# Patient Record
Sex: FEMALE | Race: OTHER | HISPANIC OR LATINO | URBAN - METROPOLITAN AREA
[De-identification: names, ages, dates, MRNs, and addresses within clinical notes are randomized per-mention and may not be internally consistent; named-entity substitution may affect disease eponyms.]

---

## 2022-02-18 ENCOUNTER — OUTPATIENT (OUTPATIENT)
Dept: OUTPATIENT SERVICES | Facility: HOSPITAL | Age: 47
LOS: 1 days | End: 2022-02-18
Payer: MEDICAID

## 2022-02-18 VITALS
RESPIRATION RATE: 17 BRPM | TEMPERATURE: 99 F | WEIGHT: 164.91 LBS | DIASTOLIC BLOOD PRESSURE: 73 MMHG | HEIGHT: 58 IN | HEART RATE: 60 BPM | OXYGEN SATURATION: 98 % | SYSTOLIC BLOOD PRESSURE: 150 MMHG

## 2022-02-18 DIAGNOSIS — S83.241A OTHER TEAR OF MEDIAL MENISCUS, CURRENT INJURY, RIGHT KNEE, INITIAL ENCOUNTER: ICD-10-CM

## 2022-02-18 DIAGNOSIS — Z98.891 HISTORY OF UTERINE SCAR FROM PREVIOUS SURGERY: Chronic | ICD-10-CM

## 2022-02-18 DIAGNOSIS — Z29.9 ENCOUNTER FOR PROPHYLACTIC MEASURES, UNSPECIFIED: ICD-10-CM

## 2022-02-18 DIAGNOSIS — Z78.9 OTHER SPECIFIED HEALTH STATUS: Chronic | ICD-10-CM

## 2022-02-18 DIAGNOSIS — S83.281A OTHER TEAR OF LATERAL MENISCUS, CURRENT INJURY, RIGHT KNEE, INITIAL ENCOUNTER: ICD-10-CM

## 2022-02-18 DIAGNOSIS — E66.9 OBESITY, UNSPECIFIED: Chronic | ICD-10-CM

## 2022-02-18 DIAGNOSIS — G47.33 OBSTRUCTIVE SLEEP APNEA (ADULT) (PEDIATRIC): ICD-10-CM

## 2022-02-18 DIAGNOSIS — Z01.818 ENCOUNTER FOR OTHER PREPROCEDURAL EXAMINATION: ICD-10-CM

## 2022-02-18 DIAGNOSIS — S83.511A SPRAIN OF ANTERIOR CRUCIATE LIGAMENT OF RIGHT KNEE, INITIAL ENCOUNTER: ICD-10-CM

## 2022-02-18 DIAGNOSIS — E66.9 OBESITY, UNSPECIFIED: ICD-10-CM

## 2022-02-18 LAB
ANION GAP SERPL CALC-SCNC: 4 MMOL/L — LOW (ref 5–17)
APTT BLD: 32.4 SEC — SIGNIFICANT CHANGE UP (ref 27.5–35.5)
BUN SERPL-MCNC: 12 MG/DL — SIGNIFICANT CHANGE UP (ref 7–18)
CALCIUM SERPL-MCNC: 8.9 MG/DL — SIGNIFICANT CHANGE UP (ref 8.4–10.5)
CHLORIDE SERPL-SCNC: 101 MMOL/L — SIGNIFICANT CHANGE UP (ref 96–108)
CO2 SERPL-SCNC: 30 MMOL/L — SIGNIFICANT CHANGE UP (ref 22–31)
CREAT SERPL-MCNC: 0.48 MG/DL — LOW (ref 0.5–1.3)
GLUCOSE SERPL-MCNC: 130 MG/DL — HIGH (ref 70–99)
HCT VFR BLD CALC: 38.1 % — SIGNIFICANT CHANGE UP (ref 34.5–45)
HGB BLD-MCNC: 12.9 G/DL — SIGNIFICANT CHANGE UP (ref 11.5–15.5)
INR BLD: 0.93 RATIO — SIGNIFICANT CHANGE UP (ref 0.88–1.16)
MCHC RBC-ENTMCNC: 28.9 PG — SIGNIFICANT CHANGE UP (ref 27–34)
MCHC RBC-ENTMCNC: 33.9 GM/DL — SIGNIFICANT CHANGE UP (ref 32–36)
MCV RBC AUTO: 85.4 FL — SIGNIFICANT CHANGE UP (ref 80–100)
NRBC # BLD: 0 /100 WBCS — SIGNIFICANT CHANGE UP (ref 0–0)
PLATELET # BLD AUTO: 391 K/UL — SIGNIFICANT CHANGE UP (ref 150–400)
POTASSIUM SERPL-MCNC: 3.4 MMOL/L — LOW (ref 3.5–5.3)
POTASSIUM SERPL-SCNC: 3.4 MMOL/L — LOW (ref 3.5–5.3)
PROTHROM AB SERPL-ACNC: 11.1 SEC — SIGNIFICANT CHANGE UP (ref 10.6–13.6)
RBC # BLD: 4.46 M/UL — SIGNIFICANT CHANGE UP (ref 3.8–5.2)
RBC # FLD: 12.6 % — SIGNIFICANT CHANGE UP (ref 10.3–14.5)
SODIUM SERPL-SCNC: 135 MMOL/L — SIGNIFICANT CHANGE UP (ref 135–145)
WBC # BLD: 10.78 K/UL — HIGH (ref 3.8–10.5)
WBC # FLD AUTO: 10.78 K/UL — HIGH (ref 3.8–10.5)

## 2022-02-18 PROCEDURE — 36415 COLL VENOUS BLD VENIPUNCTURE: CPT

## 2022-02-18 PROCEDURE — 85610 PROTHROMBIN TIME: CPT

## 2022-02-18 PROCEDURE — G0463: CPT

## 2022-02-18 PROCEDURE — 93005 ELECTROCARDIOGRAM TRACING: CPT

## 2022-02-18 PROCEDURE — 80048 BASIC METABOLIC PNL TOTAL CA: CPT

## 2022-02-18 PROCEDURE — 85027 COMPLETE CBC AUTOMATED: CPT

## 2022-02-18 PROCEDURE — 85730 THROMBOPLASTIN TIME PARTIAL: CPT

## 2022-02-18 PROCEDURE — 93010 ELECTROCARDIOGRAM REPORT: CPT

## 2022-02-18 NOTE — H&P PST ADULT - NSICDXPASTMEDICALHX_GEN_ALL_CORE_FT
PAST MEDICAL HISTORY:  Other tear of lateral meniscus of left knee as current injury, initial encounter     Other tear of medial meniscus, current injury, right knee, initial encounter     Sprain of anterior cruciate ligament of right knee, initial encounter

## 2022-02-18 NOTE — H&P PST ADULT - ASSESSMENT
46 yr old female obese no medical history stated she injured her right knee at work about a year ago. Pt went to er due to swelling X-ray done and no treatement rendered film was negative. Pt felt something still not right. Pt went on vacation and fell on her right knee and stated felt like something loosen in her knee. Pt saw orthopedist  had PT and an injection with little result. Pt schedule for right knee arthroscopy with anterior cruciate reconstruction allograft quad possible medial lateral meniscus repair on 2/24/2022. Pt does not use much medicine for discomfort pain 4/10 pt rests when she feels discomfort.

## 2022-02-18 NOTE — H&P PST ADULT - PROBLEM SELECTOR PLAN 3
Pt educated to reduce caloric intake with diet and exercise to reduce weight. Pt to follow up with PCP post procedure

## 2022-02-18 NOTE — H&P PST ADULT - PROBLEM SELECTOR PLAN 1
Pt given both written and verbal instructions to wash with 4% chlorhexidene am of surgery 2/24/2022. Pt aware of NPO the night of 2/24/2022.

## 2022-02-18 NOTE — H&P PST ADULT - NSICDXFAMILYHX_GEN_ALL_CORE_FT
FAMILY HISTORY:  Mother  Still living? Yes, Estimated age: 73  Family history of diabetes mellitus (DM), Age at diagnosis: Age Unknown

## 2022-02-18 NOTE — H&P PST ADULT - HISTORY OF PRESENT ILLNESS
46 yr old female obese no medical history stated she injured her right knee at work about a year ago. Pt went to er due to swelling X-ray done and no treatement rendered film was negative. Pt felt something still not right. Pt went on vacation and fell on her right knee and stated felt like something loosen in her knee. Pt saw orthopedist  had PT and an injection with little result. Pt schedule for right knee arthroscopy with anterior cruciate reconstruction allograft quad possible medial lateral meniscus repair on 2/24/2022. pt does not use much medication rests when her knee bothers her. Pain 4/10.at times.

## 2022-02-24 ENCOUNTER — OUTPATIENT (OUTPATIENT)
Dept: OUTPATIENT SERVICES | Facility: HOSPITAL | Age: 47
LOS: 1 days | End: 2022-02-24
Payer: MEDICAID

## 2022-02-24 VITALS
SYSTOLIC BLOOD PRESSURE: 120 MMHG | RESPIRATION RATE: 15 BRPM | TEMPERATURE: 98 F | DIASTOLIC BLOOD PRESSURE: 60 MMHG | HEART RATE: 77 BPM | OXYGEN SATURATION: 98 %

## 2022-02-24 VITALS
HEART RATE: 64 BPM | RESPIRATION RATE: 18 BRPM | WEIGHT: 164.91 LBS | HEIGHT: 58 IN | DIASTOLIC BLOOD PRESSURE: 71 MMHG | TEMPERATURE: 100 F | SYSTOLIC BLOOD PRESSURE: 138 MMHG | OXYGEN SATURATION: 100 %

## 2022-02-24 DIAGNOSIS — S83.511A SPRAIN OF ANTERIOR CRUCIATE LIGAMENT OF RIGHT KNEE, INITIAL ENCOUNTER: ICD-10-CM

## 2022-02-24 DIAGNOSIS — Z98.891 HISTORY OF UTERINE SCAR FROM PREVIOUS SURGERY: Chronic | ICD-10-CM

## 2022-02-24 DIAGNOSIS — E66.9 OBESITY, UNSPECIFIED: Chronic | ICD-10-CM

## 2022-02-24 DIAGNOSIS — S83.241A OTHER TEAR OF MEDIAL MENISCUS, CURRENT INJURY, RIGHT KNEE, INITIAL ENCOUNTER: ICD-10-CM

## 2022-02-24 DIAGNOSIS — S83.281A OTHER TEAR OF LATERAL MENISCUS, CURRENT INJURY, RIGHT KNEE, INITIAL ENCOUNTER: ICD-10-CM

## 2022-02-24 LAB — HCG UR QL: NEGATIVE — SIGNIFICANT CHANGE UP

## 2022-02-24 PROCEDURE — 29888 ARTHRS AID ACL RPR/AGMNTJ: CPT | Mod: RT

## 2022-02-24 PROCEDURE — C1713: CPT

## 2022-02-24 PROCEDURE — 29881 ARTHRS KNE SRG MNISECTMY M/L: CPT | Mod: RT

## 2022-02-24 PROCEDURE — 29877 ARTHRS KNEE SURG DBRDMT/SHVG: CPT | Mod: RT

## 2022-02-24 PROCEDURE — 29883 ARTHRS KNE SRG MNISC RPR M&L: CPT | Mod: RT

## 2022-02-24 PROCEDURE — 81025 URINE PREGNANCY TEST: CPT

## 2022-02-24 PROCEDURE — 97161 PT EVAL LOW COMPLEX 20 MIN: CPT

## 2022-02-24 DEVICE — IMP TIGHTROPE BTB: Type: IMPLANTABLE DEVICE | Status: FUNCTIONAL

## 2022-02-24 DEVICE — IMPLANTABLE DEVICE: Type: IMPLANTABLE DEVICE | Status: FUNCTIONAL

## 2022-02-24 DEVICE — IMP TIGHTROPE BTB IB W/ FLIPCUTTER III DRILL: Type: IMPLANTABLE DEVICE | Status: FUNCTIONAL

## 2022-02-24 DEVICE — IMP TIGHTROPE II ABS OPEN: Type: IMPLANTABLE DEVICE | Status: FUNCTIONAL

## 2022-02-24 DEVICE — ANCHOR SUT FIBERSTITCH 2-0 CRVD: Type: IMPLANTABLE DEVICE | Status: FUNCTIONAL

## 2022-02-24 RX ORDER — HYDROMORPHONE HYDROCHLORIDE 2 MG/ML
0.5 INJECTION INTRAMUSCULAR; INTRAVENOUS; SUBCUTANEOUS
Refills: 0 | Status: DISCONTINUED | OUTPATIENT
Start: 2022-02-24 | End: 2022-02-24

## 2022-02-24 RX ORDER — KETOROLAC TROMETHAMINE 30 MG/ML
30 SYRINGE (ML) INJECTION EVERY 6 HOURS
Refills: 0 | Status: DISCONTINUED | OUTPATIENT
Start: 2022-02-24 | End: 2022-02-24

## 2022-02-24 RX ORDER — SODIUM CHLORIDE 9 MG/ML
3 INJECTION INTRAMUSCULAR; INTRAVENOUS; SUBCUTANEOUS EVERY 8 HOURS
Refills: 0 | Status: DISCONTINUED | OUTPATIENT
Start: 2022-02-24 | End: 2022-02-24

## 2022-02-24 RX ORDER — ASPIRIN/CALCIUM CARB/MAGNESIUM 324 MG
1 TABLET ORAL
Qty: 0 | Refills: 0 | DISCHARGE

## 2022-02-24 RX ORDER — ACETAMINOPHEN 500 MG
975 TABLET ORAL ONCE
Refills: 0 | Status: DISCONTINUED | OUTPATIENT
Start: 2022-02-24 | End: 2022-02-24

## 2022-02-24 RX ORDER — HYDROMORPHONE HYDROCHLORIDE 2 MG/ML
1 INJECTION INTRAMUSCULAR; INTRAVENOUS; SUBCUTANEOUS
Refills: 0 | Status: DISCONTINUED | OUTPATIENT
Start: 2022-02-24 | End: 2022-02-24

## 2022-02-24 RX ORDER — OXYCODONE AND ACETAMINOPHEN 5; 325 MG/1; MG/1
1 TABLET ORAL EVERY 4 HOURS
Refills: 0 | Status: DISCONTINUED | OUTPATIENT
Start: 2022-02-24 | End: 2022-02-24

## 2022-02-24 RX ORDER — ONDANSETRON 8 MG/1
4 TABLET, FILM COATED ORAL EVERY 6 HOURS
Refills: 0 | Status: DISCONTINUED | OUTPATIENT
Start: 2022-02-24 | End: 2022-03-03

## 2022-02-24 NOTE — ASU PATIENT PROFILE, ADULT - FALL HARM RISK - UNIVERSAL INTERVENTIONS
Bed in lowest position, wheels locked, appropriate side rails in place/Call bell, personal items and telephone in reach/Instruct patient to call for assistance before getting out of bed or chair/Non-slip footwear when patient is out of bed/[place] to call system/Physically safe environment - no spills, clutter or unnecessary equipment/Purposeful Proactive Rounding/Room/bathroom lighting operational, light cord in reach

## 2022-02-24 NOTE — PHYSICAL THERAPY INITIAL EVALUATION ADULT - GENERAL OBSERVATIONS, REHAB EVAL
Pt. found lying supine, has nataliya brace on right LE. Ace wrap c/d/i. Pt. cooperative and motivated during eval.

## 2022-02-24 NOTE — ASU DISCHARGE PLAN (ADULT/PEDIATRIC) - ASU DC SPECIAL INSTRUCTIONSFT
Keep wound/bandage clean, dry and intact. Return to ER if Fever of 101 F or greater develops     Follow up with Dr Shorty López, at [phone], in 2 weeks     Keep brace on the right knee at all times    NON WEIGHT BEARING right knee    PRESCRIPTIONS SENT BY [facility] OFFICE. ANY QUESTIONS, PLEASE CALL [phone]

## 2022-02-24 NOTE — ASU DISCHARGE PLAN (ADULT/PEDIATRIC) - CARE PROVIDER_API CALL
Shorty López)  Orthopaedic Surgery  [address] [place] [address]  Phone: [phone]  Fax: [phone]  Follow Up Time:

## 2022-02-24 NOTE — BRIEF OPERATIVE NOTE - NSICDXBRIEFPROCEDURE_GEN_ALL_CORE_FT
PROCEDURES:  Arthroscopy of right knee with repair of meniscus 24-Feb-2022 16:52:24 lateral meniscal repair Nicholas Omalley

## 2024-03-01 NOTE — H&P PST ADULT - NS PRO ABUSE SCREEN SUSPICION NEGLECT YN
BP remains above goal (<130/80mmHg) despite recent weight loss and adherence to medications. Patient will continue weight loss attempts, keep home blood pressure log, and follow-up in 3 months (offered 6 week appointment, but he'd like to wait). If above goal on home measurements, will discuss additional treatment options.    no

## (undated) DEVICE — STICK TIGERWIRE NUMBER 2

## (undated) DEVICE — ELCTR SUCTION WAND TURBO VAC 90 DEG W CABLE

## (undated) DEVICE — PACK KNEE ARTHROSCOPY

## (undated) DEVICE — DRAIN BLAKE #19

## (undated) DEVICE — SPONGE LAP PAD W RING 18X18"

## (undated) DEVICE — SUT MONOSOF 3-0 18" P-12

## (undated) DEVICE — FOR-ESU VALLEYLAB T7E14843DX: Type: DURABLE MEDICAL EQUIPMENT

## (undated) DEVICE — Device

## (undated) DEVICE — GLV 8.5 PROTEXIS

## (undated) DEVICE — SHAVER BLADE GREAT WHITE 4.2MM

## (undated) DEVICE — FOR-TOURNIQUET 1130808363: Type: DURABLE MEDICAL EQUIPMENT

## (undated) DEVICE — WRAP COMPRESSION CALF MED

## (undated) DEVICE — SOL IRR LR 3000ML

## (undated) DEVICE — TUBING DEPUY MITEK FMS OUTFLOW

## (undated) DEVICE — KNOT PUSHER WITH PORTAL SKID

## (undated) DEVICE — BLANKET WARMER UPPER ADULT

## (undated) DEVICE — TUBING DEPUY MITEK FMS INFLOW

## (undated) DEVICE — BUR S&N DYONICS ELITE ABRADER 5.5MM

## (undated) DEVICE — DRAPE HALF SHEET 40X57"

## (undated) DEVICE — SUT FIBERSTICK SIZE 2 50" BLUE